# Patient Record
Sex: MALE | Race: WHITE | NOT HISPANIC OR LATINO | Employment: UNEMPLOYED | URBAN - METROPOLITAN AREA
[De-identification: names, ages, dates, MRNs, and addresses within clinical notes are randomized per-mention and may not be internally consistent; named-entity substitution may affect disease eponyms.]

---

## 2023-10-06 ENCOUNTER — OFFICE VISIT (OUTPATIENT)
Dept: FAMILY MEDICINE CLINIC | Facility: CLINIC | Age: 48
End: 2023-10-06
Payer: COMMERCIAL

## 2023-10-06 VITALS
BODY MASS INDEX: 30.42 KG/M2 | HEART RATE: 86 BPM | DIASTOLIC BLOOD PRESSURE: 80 MMHG | SYSTOLIC BLOOD PRESSURE: 132 MMHG | WEIGHT: 205.4 LBS | HEIGHT: 69 IN | OXYGEN SATURATION: 96 %

## 2023-10-06 DIAGNOSIS — G40.919 BREAKTHROUGH SEIZURE: ICD-10-CM

## 2023-10-06 DIAGNOSIS — Z76.89 ENCOUNTER TO ESTABLISH CARE: Primary | ICD-10-CM

## 2023-10-06 DIAGNOSIS — G47.9 SLEEP DISTURBANCES: ICD-10-CM

## 2023-10-06 DIAGNOSIS — G40.909 NONINTRACTABLE EPILEPSY WITHOUT STATUS EPILEPTICUS, UNSPECIFIED EPILEPSY TYPE: ICD-10-CM

## 2023-10-06 RX ORDER — LEVETIRACETAM 1000 MG/1
2000 TABLET ORAL
COMMUNITY
Start: 2023-10-01 | End: 2023-10-06 | Stop reason: SDUPTHER

## 2023-10-06 RX ORDER — LEVETIRACETAM 1000 MG/1
2000 TABLET ORAL 2 TIMES DAILY
Qty: 120 TABLET | Refills: 3 | Status: SHIPPED | OUTPATIENT
Start: 2023-10-06 | End: 2024-02-03

## 2023-10-06 RX ORDER — AMITRIPTYLINE HYDROCHLORIDE 50 MG/1
50 TABLET, FILM COATED ORAL NIGHTLY
Qty: 30 TABLET | Refills: 2 | Status: SHIPPED | OUTPATIENT
Start: 2023-10-06

## 2023-10-06 NOTE — PROGRESS NOTES
"    New Patient Office Visit      Date: 10/06/2023   Patient Name: Alejo Kohler  : 1975   MRN: [id number]     Chief Complaint:    Chief Complaint   Patient presents with    Establish Care     Medication refills       History of Present Illness: Alejo Kohler is a 48 y.o. male who is here today to establish care.      Subjective      HPI:  Patient is currently living in [facility] housing [facility].  Moved here from [place], originally from [place].  Presents today to establish care with new PCP.    Had 2 recent ER visits for breakthrough seizures.  Diagnosed with epilepsy years ago.  Seizures started in high school.  States that he suffered multiple concussions through sports.  Used to be on Dilantin for maintenance therapy and reports this worked well for him.  Was seeing a Neurologist from [facility] and was switched from Dilantin to Keppra.  Dose was gradually titrated up to 2000 mg twice daily.  Has been on this for nearly 1 year.  States he still had occasional breakthrough seizures while on the Keppra.  Unfortunately has been out of medication for the last several weeks due to lack of insurance.  During his ER visits he has declined laboratory evaluation for work-up of breakthrough seizures.  Suspected to be in setting of medication lapse.  Patient also admits that he has not been sleeping well.  Sleep is always been an issue for him seems to be worse over the last several weeks.  Can fall asleep pretty easily but will wake up within 30 minutes and cannot fall back asleep.  Has tried multiple medications in the past including melatonin, amitriptyline, Seroquel, trazodone.  States the amitriptyline worked well at 150 mg nightly but caused itching at the higher doses.  Melatonin and trazodone were ineffective.  Seroquel worked well.    History of polysubstance use, sober now for 28 months.  Denies any history of IV drug use.  States that he used \"everything\" but mentions specifically \"smoking " "dope.\"    Denies any other chronic medical problems.  Current smoker 0.5 PPD. Not interested in quitting.     Review of Systems:   Negative/not pertinent unless otherwise noted above in HPI.     Past Medical History:   Past Medical History:   Diagnosis Date    Seizures        Past Surgical History:   Past Surgical History:   Procedure Laterality Date    CHOLECYSTECTOMY         Family History:   Family History   Problem Relation Age of Onset    COPD Mother     Cirrhosis Father        Social History:   Social History     Socioeconomic History    Marital status:    Tobacco Use    Smoking status: Every Day     Packs/day: 0.50     Types: Cigarettes    Smokeless tobacco: Never   Substance and Sexual Activity    Alcohol use: Not Currently    Drug use: Not Currently    Sexual activity: Defer       Medications:     Current Outpatient Medications:     levETIRAcetam (KEPPRA) 1000 MG tablet, Take 2 tablets by mouth 2 (Two) Times a Day for 120 days., Disp: 120 tablet, Rfl: 3    amitriptyline (ELAVIL) 50 MG tablet, Take 1 tablet by mouth Every Night., Disp: 30 tablet, Rfl: 2    Allergies:   Allergies   Allergen Reactions    Penicillins Anaphylaxis and Rash       Immunizations:    There is no immunization history on file for this patient.      Tobacco Use: High Risk    Smoking Tobacco Use: Every Day    Smokeless Tobacco Use: Never    Passive Exposure: Not on file       Social History     Substance and Sexual Activity   Alcohol Use Not Currently        Social History     Substance and Sexual Activity   Drug Use Not Currently        PHQ-2 Depression Screening  Little interest or pleasure in doing things? 0-->not at all   Feeling down, depressed, or hopeless? 0-->not at all   PHQ-2 Total Score 0     PHQ-9 Total Score: 0     Objective     Physical Exam:  Vital Signs:   Vitals:    10/06/23 1029   BP: 132/80   BP Location: Left arm   Patient Position: Sitting   Cuff Size: Adult   Pulse: 86   SpO2: 96%   Weight: 93.2 kg (205 lb " "6.4 oz)   Height: 175.3 cm (69\")     Body mass index is 30.33 kg/m².    Physical Exam  Vitals reviewed.   Constitutional:       General: He is not in acute distress.     Appearance: Normal appearance. He is normal weight.   HENT:      Head: Normocephalic and atraumatic.      Right Ear: Tympanic membrane normal.      Left Ear: Tympanic membrane normal.      Nose: Nose normal.      Mouth/Throat:      Mouth: Mucous membranes are moist.      Pharynx: Oropharynx is clear. No oropharyngeal exudate or posterior oropharyngeal erythema.   Eyes:      Extraocular Movements: Extraocular movements intact.      Conjunctiva/sclera: Conjunctivae normal.      Pupils: Pupils are equal, round, and reactive to light.   Cardiovascular:      Rate and Rhythm: Normal rate and regular rhythm.      Heart sounds: Normal heart sounds. No murmur heard.  Pulmonary:      Effort: Pulmonary effort is normal.      Breath sounds: No wheezing.   Abdominal:      General: Abdomen is flat. Bowel sounds are normal.      Palpations: Abdomen is soft. There is no mass.      Tenderness: There is no abdominal tenderness.   Musculoskeletal:         General: Normal range of motion.      Cervical back: Normal range of motion and neck supple.   Lymphadenopathy:      Cervical: No cervical adenopathy.   Neurological:      General: No focal deficit present.      Mental Status: He is alert.   Psychiatric:         Mood and Affect: Mood normal.         Thought Content: Thought content normal.     Labs:   No results found for: HGBA1C, CMP, CBCDIFFPANEL, CREAT, TSH       Assessment / Plan      Assessment/Plan:   Diagnoses and all orders for this visit:    1. Encounter to establish care (Primary)  -     Comprehensive Metabolic Panel; Future  -     CBC & Differential; Future  -     TSH Rfx On Abnormal To Free T4; Future    2. Sleep disturbances  -     amitriptyline (ELAVIL) 50 MG tablet; Take 1 tablet by mouth Every Night.  Dispense: 30 tablet; Refill: 2    3. " Nonintractable epilepsy without status epilepticus, unspecified epilepsy type  -     levETIRAcetam (KEPPRA) 1000 MG tablet; Take 2 tablets by mouth 2 (Two) Times a Day for 120 days.  Dispense: 120 tablet; Refill: 3    4. Breakthrough seizure  -     Comprehensive Metabolic Panel; Future  -     CBC & Differential; Future  -     TSH Rfx On Abnormal To Free T4; Future      Sleep Disturbance  -Uncontrolled, mainly issues with sleep maintenance.  Patient has had success with amitriptyline in the past so we will restart at 50 mg nightly.  Requested Seroquel specifically but would prefer to trial amitriptyline first.  May titrate up as needed if he is tolerating.  Previously tried and failed trazodone, melatonin.    Epilepsy  -Recent breakthrough seizures while out of Keppra.  Patient declines referral to neurology.  Previously following with provider at [facility]. Will send refill for Keppra 2,000mg BID. This is the maximum amount recommended daily but pt has tolerated in the past.  Advised that if he continues to have breakthrough seizures dose of Keppra he will need to see neurology for further management.      Alejo Kohler voices understanding and acceptance of this advice and will call back with any further questions or concerns. AVS with preventive healthcare tips printed for patient.         Follow Up:   Return in about 6 weeks (around 11/17/2023) for Recheck sleep.        Nancy Coelho DO  [facility] [place] [facility] [address]

## 2023-10-10 ENCOUNTER — PRIOR AUTHORIZATION (OUTPATIENT)
Dept: FAMILY MEDICINE CLINIC | Facility: CLINIC | Age: 48
End: 2023-10-10
Payer: COMMERCIAL

## 2023-10-12 ENCOUNTER — READMISSION MANAGEMENT (OUTPATIENT)
Dept: CALL CENTER | Facility: HOSPITAL | Age: 48
End: 2023-10-12
Payer: COMMERCIAL

## 2023-10-12 NOTE — OUTREACH NOTE
Prep Survey      Flowsheet Row Responses   [place] facility patient discharged from? Non-BH   Is LACE score < 7 ? Non-BH Discharge   Eligibility [facility]   Date of Admission 10/08/23   Date of Discharge 10/12/23   Discharge Disposition Home or Self Care   Discharge diagnosis Seizure   Does the patient have one of the following disease processes/diagnoses(primary or secondary)? Other   Prep survey completed? Yes            Nallely COLMENARES - Registered Nurse

## 2023-10-13 ENCOUNTER — TRANSITIONAL CARE MANAGEMENT TELEPHONE ENCOUNTER (OUTPATIENT)
Dept: CALL CENTER | Facility: HOSPITAL | Age: 48
End: 2023-10-13
Payer: COMMERCIAL

## 2023-10-13 NOTE — TELEPHONE ENCOUNTER
Approvedon October 12  The request has been approved. The authorization is effective from 10/12/2023 to 10/11/2024, as long as the member is enrolled in their current health plan. The request was reviewed and approved by a licensed clinical pharmacist. This request is approved with a quantity limit of 4 tablets per day. A written notification letter will follow with additional details.

## 2023-10-13 NOTE — OUTREACH NOTE
Call Center TCM Note      Flowsheet Row Responses   [facility] of hospitals in [place] patient discharged from? Non-BH   Does the patient have one of the following disease processes/diagnoses(primary or secondary)? Other   TCM attempt successful? Yes  [1st number is rehab facility and patient is no longer at facility.]   Call start time 1313   Call end time 1320   Discharge diagnosis Seizure   Person spoke with today (if not patient) and relationship  [place] reviewed with patient/caregiver? Yes   Is the patient having any side effects they believe may be caused by any medication additions or changes? No   Does the patient have all medications ordered at discharge? Yes   Is the patient taking all medications as directed (includes completed medication regime)? Yes   Comments  and patient will call and schedule followup with PCP office.   Does the patient have an appointment with their PCP within 7-14 days of discharge? No   Nursing Interventions Patient declined scheduling/rescheduling appointment at this time   Psychosocial issues? Yes   Psychosocial comments Patient is in alcohol rehab   Did the patient receive a copy of their discharge instructions? Yes   Nursing interventions Reviewed instructions with patient   What is the patient's perception of their health status since discharge? Same   Is the patient/caregiver able to teach back signs and symptoms related to disease process for when to call PCP? Yes   Is the patient/caregiver able to teach back signs and symptoms related to disease process for when to call 911? Yes   Is the patient/caregiver able to teach back the hierarchy of who to call/visit for symptoms/problems? PCP, Specialist, Home health nurse, Urgent Care, ED, 911 Yes   If the patient is a current smoker, are they able to teach back resources for cessation? Not a smoker   TCM call completed? Yes   Wrap up additional comments Patient was hospitalized for seizures   Call end time 1320    Would this patient benefit from a Referral to [facility] Social Work? No   Is the patient interested in additional calls from an ambulatory ? No            Reuben Dunaway RN    10/13/2023, 13:20 EDT

## 2023-10-19 ENCOUNTER — OFFICE VISIT (OUTPATIENT)
Dept: FAMILY MEDICINE CLINIC | Facility: CLINIC | Age: 48
End: 2023-10-19
Payer: COMMERCIAL

## 2023-10-19 VITALS
OXYGEN SATURATION: 98 % | SYSTOLIC BLOOD PRESSURE: 120 MMHG | BODY MASS INDEX: 30.98 KG/M2 | DIASTOLIC BLOOD PRESSURE: 84 MMHG | WEIGHT: 209.2 LBS | HEART RATE: 84 BPM | HEIGHT: 69 IN

## 2023-10-19 DIAGNOSIS — G40.909 NONINTRACTABLE EPILEPSY WITHOUT STATUS EPILEPTICUS, UNSPECIFIED EPILEPSY TYPE: Primary | ICD-10-CM

## 2023-10-19 PROBLEM — R56.9 SEIZURE: Status: ACTIVE | Noted: 2023-10-11

## 2023-10-19 NOTE — LETTER
October 19, 2023    Alejo Kohler  [address] [place] [address]        To Whom It May Concern,    Alejo Kohler is a patient under my care. I am familiar with his medical history. At his initial appointment to establish care on 10/6/23 he was prescribed Keppra for his seizure disorder and Amitriptyline to help with sleep. It appears that in the interim he was admitted as an inpatient to the [facility] for breakthrough seizures. Per his medical records he was given 3 doses of Dilantin on 10/11/23 during his admission. He also has a paper prescription for Dilantin in hand today during his visit. To the best of my knowledge these are the only prescription medications he has been provided.        Nancy Coelho, DO                       Nancy Coelho, DO

## 2023-10-19 NOTE — PROGRESS NOTES
Chief Complaint   Patient presents with    Med Refill       HPI:  Alejo Kohler is a 48 y.o. male with PMHx seizure d/o, polysubstance use d/o who presents today for med check.    Pt currently in sober living facility. States he tested positive for barbiturates on his UDS two weeks ago. Denies taking any non prescription medications. Wondering if anything he was prescribed by our office could cause false positive.  Of note, he was admitted to  since his [facility] care visit with me for breakthrough seizures. [facility] It appears that he was admitted as a stroke alert and imaging ruled out acute intracranial event. He was admitted for further treatment of his breakthrough seizures and did receive 3 doses of Dilantin during his admission prior to leaving [facility]. He does have a paper prescription for Dilantin 100mg TID in hand today.      PE:  Vitals:    10/19/23 0912   BP: 120/84   Pulse: 84   SpO2: 98%      Body mass index is 30.89 kg/m².    Gen Appearance: NAD  HEENT: Normocephalic, EOMI  Lungs: Normal respiratory effort  MSK: Moves all extremities well, normal gait, no peripheral edema  Neuro: No focal deficits, alert and oriented, appropriate historian    Current Outpatient Medications   Medication Sig Dispense Refill    amitriptyline (ELAVIL) 50 MG tablet Take 1 tablet by mouth Every Night. 30 tablet 2    levETIRAcetam (KEPPRA) 1000 MG tablet Take 2 tablets by mouth 2 (Two) Times a Day for 120 days. 120 tablet 3     No current facility-administered medications for this visit.        A/P:  Diagnoses and all orders for this visit:    1. Nonintractable epilepsy without status epilepticus, unspecified epilepsy type (Primary)  -     Ambulatory Referral to Neurology      Advised patient that since I was not the ordering person for his UDS I am really unable to comment on any possibility of false positive result. I was willing to write a letter which states his current prescription medications and  confirm that he was given Dilantin during his admission at  which can be found in his discharge summary report in Care Everywhere.   I advised he speak with the ordering person/laboratory to discuss possibility of any false positive/cross reactivity.     Referral placed to Neurology for further management of his seizure disorder        Dictated Utilizing Dragon Dictation    Please note that portions of this note were completed with a voice recognition program.    Part of this note may be an electronic transcription/translation of spoken language to printed text using the Dragon Dictation System.